# Patient Record
Sex: MALE | Race: WHITE | Employment: FULL TIME | URBAN - METROPOLITAN AREA
[De-identification: names, ages, dates, MRNs, and addresses within clinical notes are randomized per-mention and may not be internally consistent; named-entity substitution may affect disease eponyms.]

---

## 2022-01-26 ENCOUNTER — HOSPITAL ENCOUNTER (OUTPATIENT)
Dept: GENERAL RADIOLOGY | Age: 47
Discharge: HOME | End: 2022-01-26
Payer: OTHER GOVERNMENT

## 2022-01-26 DIAGNOSIS — M51.36 DEGENERATIVE DISC DISEASE, LUMBAR: ICD-10-CM

## 2022-01-26 DIAGNOSIS — M54.16 LUMBAR RADICULOPATHY: ICD-10-CM

## 2022-01-26 DIAGNOSIS — M48.061 STENOSIS OF LATERAL RECESS OF LUMBAR SPINE: ICD-10-CM

## 2022-01-26 DIAGNOSIS — M21.372 FOOT DROP, LEFT FOOT: ICD-10-CM

## 2022-01-26 DIAGNOSIS — M48.061 NEUROFORAMINAL STENOSIS OF LUMBAR SPINE: ICD-10-CM

## 2022-01-26 PROCEDURE — 72120 X-RAY BEND ONLY L-S SPINE: CPT

## 2022-03-26 ENCOUNTER — HOSPITAL ENCOUNTER (OUTPATIENT)
Dept: MRI IMAGING | Age: 47
Discharge: HOME | End: 2022-03-26
Attending: NEUROLOGICAL SURGERY
Payer: OTHER GOVERNMENT

## 2022-03-26 DIAGNOSIS — M48.061 NEUROFORAMINAL STENOSIS OF LUMBAR SPINE: ICD-10-CM

## 2022-03-26 DIAGNOSIS — M48.061 STENOSIS OF LATERAL RECESS OF LUMBAR SPINE: ICD-10-CM

## 2022-03-26 DIAGNOSIS — M54.16 LUMBAR RADICULOPATHY: ICD-10-CM

## 2022-03-26 DIAGNOSIS — M51.36 DEGENERATIVE DISC DISEASE, LUMBAR: ICD-10-CM

## 2022-03-26 PROCEDURE — 72148 MRI LUMBAR SPINE W/O DYE: CPT

## 2022-05-30 DIAGNOSIS — M51.36 OTHER INTERVERTEBRAL DISC DEGENERATION, LUMBAR REGION: ICD-10-CM

## 2022-05-30 DIAGNOSIS — M48.061 SPINAL STENOSIS, LUMBAR REGION WITHOUT NEUROGENIC CLAUDICATION: ICD-10-CM

## 2022-05-31 RX ORDER — CYCLOBENZAPRINE HCL 10 MG
TABLET ORAL
Qty: 45 TABLET | Refills: 0 | Status: SHIPPED | OUTPATIENT
Start: 2022-05-31 | End: 2022-06-13

## 2022-06-02 ENCOUNTER — PATIENT MESSAGE (OUTPATIENT)
Dept: NEUROSURGERY | Age: 47
End: 2022-06-02

## 2022-06-02 NOTE — TELEPHONE ENCOUNTER
From: Reinier Aguilar. To: Dr. Supriya Gasca: 6/2/2022 3:29 PM EDT  Subject: Return to work     Rolo Ring, like we discussed last Friday May 27th after my epidural, my back pain is minimal. I still have the same issues as before I injured back in early February. At this point, I feel strongly enough to return to work and physically able to do my job. I was hoping to speak with Dr. Antonio Jarrell yesterday for my follow-up appointment however my appointment was rescheduled for June 13th. My employer requires a note from Dr. Antonio Jarrell stating I have no restrictions. I was hoping to return to work 6/6/2022. Thank you!       Kind Regards,     Cortez Rose [address]

## 2022-06-12 DIAGNOSIS — M51.36 OTHER INTERVERTEBRAL DISC DEGENERATION, LUMBAR REGION: ICD-10-CM

## 2022-06-12 DIAGNOSIS — M48.061 SPINAL STENOSIS, LUMBAR REGION WITHOUT NEUROGENIC CLAUDICATION: ICD-10-CM

## 2022-06-13 ENCOUNTER — TELEPHONE (OUTPATIENT)
Dept: NEUROSURGERY | Age: 47
End: 2022-06-13

## 2022-06-13 ENCOUNTER — OFFICE VISIT (OUTPATIENT)
Dept: NEUROSURGERY | Age: 47
End: 2022-06-13
Payer: OTHER GOVERNMENT

## 2022-06-13 VITALS
TEMPERATURE: 96.8 F | HEIGHT: 74 IN | BODY MASS INDEX: 36.7 KG/M2 | DIASTOLIC BLOOD PRESSURE: 117 MMHG | HEART RATE: 112 BPM | OXYGEN SATURATION: 94 % | SYSTOLIC BLOOD PRESSURE: 149 MMHG | WEIGHT: 286 LBS

## 2022-06-13 DIAGNOSIS — M54.16 LUMBAR RADICULOPATHY: Primary | ICD-10-CM

## 2022-06-13 PROCEDURE — 99213 OFFICE O/P EST LOW 20 MIN: CPT | Performed by: NEUROLOGICAL SURGERY

## 2022-06-13 RX ORDER — CYCLOBENZAPRINE HCL 10 MG
TABLET ORAL
Qty: 45 TABLET | Refills: 0 | Status: SHIPPED | OUTPATIENT
Start: 2022-06-13 | End: 2022-07-13

## 2022-06-13 ASSESSMENT — ENCOUNTER SYMPTOMS: BACK PAIN: 1

## 2022-06-13 NOTE — TELEPHONE ENCOUNTER
Pt was seen today and released on a PRN basis. Dr. Lam Godoy ordered Physical Therapy. Pt wants to talk to the [facility] and see where he can go.  He will call back for referral .

## 2022-06-13 NOTE — PROGRESS NOTES
[facility] CL[facility]IC NOTE:   History of Present Illness:    CC: Follow-up evaluation    Karen Higuera is a 55 y.o. male who presents today for follow-up evaluation of low back and left lower extremity pain. The patient is now undergone conservative measures in the form of epidural steroid injections and pain management. He believes that most recently the Flexeril that we have prescribed him has provided him with significant relief. He states that 1 morning he just woke up in the left lower extremity pain and discomfort that he was experiencing had resolved. He now rates his baseline pain is a 4 out of 10 on a visual analog pain scale prior to that was 8 out of 10 on a visual analog pain scale. The patient has returned to his chosen profession and employment with [employer] where he works as a . He is pleased with his progress and eager to begin some physical therapy to help strengthen his left foot. He had an EMG/NCV that demonstrated a chronic L5 radiculopathy. Past Medical History:   Diagnosis Date    Arthritis     Calculus of kidney     Chronic low back pain     Chronic pain     GERD (gastroesophageal reflux disease)     Hypertension     Incontinence of urine     Neuropathy     Left foot and leg    Pain in both feet       Past Surgical History:   Procedure Laterality Date    KNEE ARTHROSCOPY Bilateral     ORTHOPEDIC SURGERY Right 2018    broken arm repair    TONSILLECTOMY Bilateral      Allergies   Allergen Reactions    Naproxen Sodium Anaphylaxis    Penicillins Anaphylaxis     In childhood    Alcohol Other (See Comments)     Sweaty, redness and slightly itchy      History reviewed. No pertinent family history.    Social History     Socioeconomic History    Marital status:      Spouse name: Not on file    Number of children: Not on file    Years of education: Not on file    Highest education level: Not on file   Occupational History    Not on file   Tobacco Use    Smoking status: Never Smoker    Smokeless tobacco: Former User   Substance and Sexual Activity    Alcohol use: Not Currently    Drug use: Never    Sexual activity: Not on file   Other Topics Concern    Not on file   Social History Narrative    Not on file     Social Determinants of Health     Financial Resource Strain:     Difficulty of Paying Living Expenses: Not on file   Food Insecurity:     Worried About Running Out of Food in the Last Year: Not on file    Aviva of Food in the Last Year: Not on file   Transportation Needs:     Lack of Transportation (Medical): Not on file    Lack of Transportation (Non-Medical): Not on file   Physical Activity:     Days of Exercise per Week: Not on file    Minutes of Exercise per Session: Not on file   Stress:     Feeling of Stress : Not on file   Social Connections:     Frequency of Communication with Friends and Family: Not on file    Frequency of Social Gatherings with Friends and Family: Not on file    Attends Rastafari Services: Not on file    Active Member of [address] [place] [address] or Organizations: Not on file    Attends Club or Organization Meetings: Not on file    Marital Status: Not on file   Intimate Partner Violence:     Fear of Current or Ex-Partner: Not on file    Emotionally Abused: Not on file    Physically Abused: Not on file    Sexually Abused: Not on file   Housing Stability:     Unable to Pay for Housing in the Last Year: Not on file    Number of [place] in the Last Year: Not on file    Unstable Housing in the Last Year: Not on file     Current Outpatient Medications   Medication Sig Dispense Refill    cyclobenzaprine (FLEXERIL) 10 mg tablet TAKE 1 TABLET BY MOUTH THREE (3) TIMES DAILY AS NEEDED FOR MUSCLE SPASM(S) FOR UP TO 15 DAYS.  45 tablet 0    HYDROCHLOROTHIAZIDE PO Take by mouth      cetirizine (ZYRTEC) 10 MG tablet Take 10 mg by mouth      famotidine (PEPCID) 20 MG tablet Take 20 mg by mouth 2 times daily      fluticasone (FLONASE) 50 MCG/ACT nasal spray 2 sprays by Nasal route daily      gabapentin (NEURONTIN) 300 MG capsule Take 300 mg by mouth 3 times daily.  HYDROcodone-acetaminophen (NORCO)  MG per tablet Take 1 tablet by mouth.  metoprolol tartrate (LOPRESSOR) 25 MG tablet Take 25 mg by mouth daily       No current facility-administered medications for this visit. There is no problem list on file for this patient. Review of Systems   Musculoskeletal: Positive for back pain. Neurological: Positive for weakness. All other systems reviewed and are negative. Physical Exam:  BP (!) 168/127   Pulse (!) 112   Temp 96.8 °F (36 °C) (Temporal)   Ht 6' 2\" (1.88 m)   Wt 286 lb (129.7 kg)   SpO2 94%   BMI 36.72 kg/m²   Pain Score:   4  Head normocephalic and atraumatic  Mood and affect appropriate  General: No acute distress  CV: Regular rate  Resp: No increased work of breathing  Skin: warm and dry  Awake, alert, and oriented   Speech Fluent  Eyes open spontaneously   Face symmetric and tongue midline on protrusion  Sternocleidomastoid and trapezius strength 5/5  No mid-line cervical, thoracic, or lumbar tenderness to palpation   Patient with strength exam as follows:   Upper Extremities: Right Left      Deltoid  5 5    Biceps  5 5    Triceps 5 5      5 5     Lower Extremities:      Hip Flex 5 5    Quads  5 5    Hamstrings 5 5    Dorsiflex 5 4    Plantarflex 5 5    EHL  5 5  Sensation intact to light touch and pin-prick   DTR 2+  No clonus or babinski present   No Quijano's sign present bilaterally   Negative straight leg raise test on the right and left  Gait: normal nonantalgic gait, stands from a seated position without difficulty and ambulates independently    Assessment & Plan:  Yoly Greer is a 55 y.o. male who presents today for follow-up evaluation of low back and left lower extremity pain.   At this time the patient has had significant resolution of his low back pain and left lower extremity pain. He is pleased with his progress and very eager to begin some physical therapy again work on continued strengthening his left foot. I will therefore refer him to physical therapy 2 times a week for 8 weeks time. At this time I recommend no acute neurosurgical intervention. I will see him on an as-needed basis at anytime in the future should he have any further complaints or new symptoms. ICD-10-CM    1. Lumbar radiculopathy  M54.16        Enrique Fritz [facility], [address] [place] [address]     Notes are transcribed with [address] [place] [address], a medical voice recording dictation service, and may contain minor errors.

## 2022-07-12 DIAGNOSIS — M48.061 SPINAL STENOSIS, LUMBAR REGION WITHOUT NEUROGENIC CLAUDICATION: ICD-10-CM

## 2022-07-12 DIAGNOSIS — M51.36 OTHER INTERVERTEBRAL DISC DEGENERATION, LUMBAR REGION: ICD-10-CM

## 2022-07-13 RX ORDER — CYCLOBENZAPRINE HCL 10 MG
TABLET ORAL
Qty: 45 TABLET | Refills: 0 | Status: SHIPPED | OUTPATIENT
Start: 2022-07-13 | End: 2022-08-01

## 2022-08-01 DIAGNOSIS — M51.36 OTHER INTERVERTEBRAL DISC DEGENERATION, LUMBAR REGION: ICD-10-CM

## 2022-08-01 DIAGNOSIS — M48.061 SPINAL STENOSIS, LUMBAR REGION WITHOUT NEUROGENIC CLAUDICATION: ICD-10-CM

## 2022-08-01 RX ORDER — CYCLOBENZAPRINE HCL 10 MG
TABLET ORAL
Qty: 45 TABLET | Refills: 0 | Status: SHIPPED | OUTPATIENT
Start: 2022-08-01